# Patient Record
Sex: MALE | Race: WHITE | NOT HISPANIC OR LATINO
[De-identification: names, ages, dates, MRNs, and addresses within clinical notes are randomized per-mention and may not be internally consistent; named-entity substitution may affect disease eponyms.]

---
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
## 2024-08-20 ENCOUNTER — APPOINTMENT (OUTPATIENT)
Dept: PEDIATRIC UROLOGY | Facility: CLINIC | Age: 19
End: 2024-08-20
Payer: COMMERCIAL

## 2024-08-20 VITALS — BODY MASS INDEX: 23.3 KG/M2 | WEIGHT: 172 LBS | HEIGHT: 72 IN

## 2024-08-20 DIAGNOSIS — N43.3 HYDROCELE, UNSPECIFIED: ICD-10-CM

## 2024-08-20 PROCEDURE — 99214 OFFICE O/P EST MOD 30 MIN: CPT

## 2024-08-20 NOTE — HISTORY OF PRESENT ILLNESS
[TextBox_4] : CHIVO is here for a follow up visit today. He is a healthy 18 year child. Recently he was noted to have a LEFT hydrocele (Sept 2022) on a well visit.  At his initial consultation, upon exam, Chivo had a moderate sized left hydrocele which was confirmed on in-office scrotal ultrasound.  He has not followed up since 5/2023.  Returns today for reexamination.  No reported interval urologic issues since his last visit.

## 2024-08-20 NOTE — CONSULT LETTER
[FreeTextEntry1] : Dear Dr. RAE SALAZAR ,  I had the pleasure of seeing  TD MANUEL for follow up today.  Below is my note regarding the office visit today.  Thank you so very much for allowing me to participate in TD's  care.  Please don't hesitate to call me should any questions or issues arise .  Sincerely,   Alex Gray MD, FACS, PU Chief, Pediatric Urology Professor of Urology and Pediatrics [employer]  President, [employer] - [place] Section Past-President, Societies for Pediatric U[employer]

## 2024-08-20 NOTE — CONSULT LETTER
[FreeTextEntry1] : Dear Dr. RAE SALAZAR ,  I had the pleasure of seeing  TD MANUEL for follow up today.  Below is my note regarding the office visit today.  Thank you so very much for allowing me to participate in TD's  care.  Please don't hesitate to call me should any questions or issues arise .  Sincerely,   Alex Gray MD, FACS, PU Chief, Pediatric Urology Professor of Urology and Pediatrics [employer]  President, [employer] - [place] Section Past-President, Societies [employer] Pediatric U[employer]

## 2024-08-20 NOTE — CONSULT LETTER
[FreeTextEntry1] : Dear Dr. RAE SALAZAR ,  I had the pleasure of seeing  TD MANUEL for follow up today.  Below is my note regarding the office visit today.  Thank you so very much for allowing me to participate in TD's  care.  Please don't hesitate to call me should any questions or issues arise .  Sincerely,   Alex Gray MD, FACS, PU Chief, Pediatric Urology Professor of Urology and Pediatrics [employer] [facility] [employer]  President, [employer] - [place] Section Past-President, Societies [employer] Pediatric U[employer]

## 2024-08-20 NOTE — ASSESSMENT
[FreeTextEntry1] : Chivo still has a moderate-large left hydrocele.  We again discussed surgical correction.  Family in agreement after discussing the procedure, anticipated course and possible complications.  He will be in [place] until June and so surgery will take place when he returns

## 2024-08-20 NOTE — PHYSICAL EXAM
[TextBox_92] :   Symmetric testes in dependent position without palpable mass, hernia or varicocele.  Moderate sized left hydrocele.

## 2025-06-02 ENCOUNTER — NON-APPOINTMENT (OUTPATIENT)
Age: 20
End: 2025-06-02

## 2025-06-06 ENCOUNTER — TRANSCRIPTION ENCOUNTER (OUTPATIENT)
Age: 20
End: 2025-06-06

## 2025-06-06 ENCOUNTER — APPOINTMENT (OUTPATIENT)
Dept: PEDIATRIC UROLOGY | Facility: AMBULATORY SURGERY CENTER | Age: 20
End: 2025-06-06

## 2025-06-06 ENCOUNTER — OUTPATIENT (OUTPATIENT)
Dept: OUTPATIENT SERVICES | Facility: HOSPITAL | Age: 20
LOS: 1 days | End: 2025-06-06
Payer: COMMERCIAL

## 2025-06-06 VITALS
OXYGEN SATURATION: 100 % | RESPIRATION RATE: 18 BRPM | DIASTOLIC BLOOD PRESSURE: 73 MMHG | HEART RATE: 66 BPM | WEIGHT: 174.17 LBS | SYSTOLIC BLOOD PRESSURE: 128 MMHG | HEIGHT: 70.87 IN | TEMPERATURE: 98 F

## 2025-06-06 VITALS — HEART RATE: 57 BPM | RESPIRATION RATE: 17 BRPM | OXYGEN SATURATION: 100 %

## 2025-06-06 DIAGNOSIS — Z90.89 ACQUIRED ABSENCE OF OTHER ORGANS: Chronic | ICD-10-CM

## 2025-06-06 DIAGNOSIS — K08.409 PARTIAL LOSS OF TEETH, UNSPECIFIED CAUSE, UNSPECIFIED CLASS: Chronic | ICD-10-CM

## 2025-06-06 DIAGNOSIS — N43.3 HYDROCELE, UNSPECIFIED: ICD-10-CM

## 2025-06-06 DIAGNOSIS — Z98.890 OTHER SPECIFIED POSTPROCEDURAL STATES: Chronic | ICD-10-CM

## 2025-06-06 PROCEDURE — 54830 REMOVE EPIDIDYMIS LESION: CPT | Mod: LT

## 2025-06-06 PROCEDURE — 55060 REPAIR OF HYDROCELE: CPT | Mod: LT

## 2025-06-06 RX ORDER — ACETAMINOPHEN 500 MG/5ML
650 LIQUID (ML) ORAL EVERY 6 HOURS
Refills: 0 | Status: ACTIVE | OUTPATIENT
Start: 2025-06-06 | End: 2026-05-05

## 2025-06-06 RX ORDER — OXYCODONE HYDROCHLORIDE 30 MG/1
1 TABLET ORAL
Qty: 5 | Refills: 0
Start: 2025-06-06 | End: 2025-06-08

## 2025-06-06 RX ORDER — OXYCODONE HYDROCHLORIDE 30 MG/1
5 TABLET ORAL EVERY 4 HOURS
Refills: 0 | Status: DISCONTINUED | OUTPATIENT
Start: 2025-06-06 | End: 2025-06-06

## 2025-06-06 NOTE — ASU DISCHARGE PLAN (ADULT/PEDIATRIC) - ASU DC SPECIAL INSTRUCTIONSFT
HYDROCELECTOMY    SURGICAL WOUND: There are often lumps and bumps that can be felt in the scrotum on either or both sides up to two (2) months or more post operatively. These are of no concern and with time they will soften and disappear.  Any “black and blue” bruising areas are expected and will also resolve over weeks.  Normally, there is also swelling of the scrotum post operatively. Sometimes the tissue fluid which causes the swelling migrates to the penile skin and can look alarming; with time, all the swelling will eventually subside but may take weeks.  A scrotal support and scrotal fluffs should be worn at all times for the next few weeks, unless bathing, to minimize this swelling. You may apply an ice-pack for 15 minutes out of every hour for the first 24 -36 hours to minimize pain and swelling. There may be a drain in the surgical site which will be removed at your post operative appointment. Please change gauze dressing daily or twice daily around your drain so that it is not saturated. The output will decrease over time and should be minimal around the time of your post operative appointment.     STITCHES: The stitches in the incision will dissolve and fall out by themselves. Sometimes skin stitches may open, allowing a slight gaping of the incision. This is no problem if you keep the area clean.  There may be a bluish colored waterproof glue over the incision as well – the glue will peel away and fall off on its own over a couple of weeks.     PAIN: You may have some intermittent pain for up to six (6) weeks post operatively. Pain does not signify any problem unless associated with fever, chills, or inability to void.  If you experience any fevers or chills please call immediately as this may be signs of an infection. You may take Tylenol (acetaminophen) 650-975mg and/or Motrin (ibuprofen) 400-600mg, both available over the counter, for pain every 6 hours as needed. Do not exceed 4000mg of Tylenol (acetaminophen) daily. You may alternate these medications such that you take one or the other every 3 hours for around the clock pain coverage. For severe pain, take Percocet 5/325mg every 6 hours.     ANTIBIOTICS: You may be given a prescription for an antibiotic, please take this medication as instructed and be sure to complete the entire course.     STOOL SOFTENERS: Do not allow yourself to become constipated as straining may cause bleeding. Take stool softeners or a laxative (ex. Miralax, Colace, Senokot, ExLax, etc), available over the counter, if taking Percocet.     BATHING: You may sponge bath 24 hours after surgery, but minimize water to the surgical incision and drain.    DIET: You may resume your regular diet and regular medication regimen.    ACTIVITY: No heavy lifting or strenuous exercise until you are evaluated at your post-operative appointment. Otherwise, you may return to your usual level of physical activity.    FOLLOW-UP: Please follow up next week for drain removal and 2 weeks afterward for post op appointment.     CALL YOUR UROLOGIST IF: You have any bleeding that does not stop, inability to void >8 hours, fever over 100.4 F, chills, persistent nausea/vomiting, changes in your incision concerning for infection, or if your pain is not controlled on your discharge pain medications.

## 2025-06-06 NOTE — ASU DISCHARGE PLAN (ADULT/PEDIATRIC) - CARE PROVIDER_API CALL
Alex Gray Jesus  Pediatric Urology  [address] [place] [address], [facility] [address]  [place] [address]  Phone: [phone]  Fax: [phone]  Follow Up Time:

## 2025-06-06 NOTE — ASU DISCHARGE PLAN (ADULT/PEDIATRIC) - ACTIVITY LEVEL
No exercise/No heavy lifting No bikes until cleared by MD/No exercise/No heavy lifting/No sports/gym

## 2025-06-06 NOTE — ASU DISCHARGE PLAN (ADULT/PEDIATRIC) - FINANCIAL ASSISTANCE
[facility] provides services at a reduced cost to those who are determined to be eligible through [facility]’s financial assistance program. Information regarding [facility]’s financial assistance program can be found by going to [email or web address] or by calling [phone].

## 2025-06-09 ENCOUNTER — NON-APPOINTMENT (OUTPATIENT)
Age: 20
End: 2025-06-09

## 2025-06-09 PROBLEM — R07.9 CHEST PAIN, UNSPECIFIED: Chronic | Status: ACTIVE | Noted: 2025-06-06

## 2025-06-10 ENCOUNTER — APPOINTMENT (OUTPATIENT)
Dept: PEDIATRIC UROLOGY | Facility: CLINIC | Age: 20
End: 2025-06-10
Payer: COMMERCIAL

## 2025-06-10 VITALS — WEIGHT: 173 LBS | HEIGHT: 72 IN | BODY MASS INDEX: 23.43 KG/M2

## 2025-06-10 PROCEDURE — 99024 POSTOP FOLLOW-UP VISIT: CPT

## (undated) DEVICE — WARMING BLANKET UPPER ADULT

## (undated) DEVICE — SUT CHROMIC 4-0 27" RB-1

## (undated) DEVICE — DRSG MASTISOL

## (undated) DEVICE — DRAPE MINOR PROCEDURE

## (undated) DEVICE — DRSG TEGADERM 2.5 X 3"

## (undated) DEVICE — DRAPE SURGICAL #1010

## (undated) DEVICE — ELCTR STRYKER NEPTUNE SMOKE EVACUATION PENCIL (GREEN)

## (undated) DEVICE — WARMING BLANKET UNDERBODY PEDS 36 X 33"

## (undated) DEVICE — LIGASURE SMALL JAW

## (undated) DEVICE — POSITIONER STRAP ARMBOARD VELCRO TS-30

## (undated) DEVICE — PREP BETADINE KIT

## (undated) DEVICE — GOWN LG

## (undated) DEVICE — DRAPE 3/4 SHEET 52X76"

## (undated) DEVICE — POSITIONER PATIENT SAFETY STRAP 3X60"

## (undated) DEVICE — VENODYNE/SCD SLEEVE CALF MEDIUM

## (undated) DEVICE — GLV 7.5 PROTEXIS (WHITE)

## (undated) DEVICE — SUT VICRYL PLUS 4-0 18" RB-1 UNDYED (POP-OFF)

## (undated) DEVICE — DRSG CURITY GAUZE SPONGE 4 X 4" 12-PLY

## (undated) DEVICE — MARKING PEN DEVON X-FINE TIP W RULER

## (undated) DEVICE — DRAIN PENROSE .25" X 12" SILICONE

## (undated) DEVICE — DRSG GAUZE VASELINE PETROLEUM 1 X 8" CISION

## (undated) DEVICE — DRSG STERISTRIPS 0.5 X 4"

## (undated) DEVICE — ELCTR BOVIE TIP NEEDLE INSULATED 4" EDGE

## (undated) DEVICE — ELCTR GROUNDING PAD ADULT COVIDIEN

## (undated) DEVICE — PACK MINOR NO DRAPE

## (undated) DEVICE — POSITIONER FOAM EGG CRATE ULNAR 2PCS (PINK)

## (undated) DEVICE — SOL IRR POUR NS 0.9% 500ML

## (undated) DEVICE — DRAPE TOWEL BLUE 17" X 24"